# Patient Record
Sex: FEMALE | Race: ASIAN | NOT HISPANIC OR LATINO | Employment: OTHER | URBAN - METROPOLITAN AREA
[De-identification: names, ages, dates, MRNs, and addresses within clinical notes are randomized per-mention and may not be internally consistent; named-entity substitution may affect disease eponyms.]

---

## 2018-08-19 ENCOUNTER — APPOINTMENT (OUTPATIENT)
Dept: RADIOLOGY | Facility: MEDICAL CENTER | Age: 83
End: 2018-08-19
Attending: EMERGENCY MEDICINE

## 2018-08-19 ENCOUNTER — HOSPITAL ENCOUNTER (EMERGENCY)
Facility: MEDICAL CENTER | Age: 83
End: 2018-08-19
Attending: EMERGENCY MEDICINE

## 2018-08-19 VITALS
WEIGHT: 108 LBS | SYSTOLIC BLOOD PRESSURE: 131 MMHG | BODY MASS INDEX: 19.14 KG/M2 | RESPIRATION RATE: 20 BRPM | HEIGHT: 63 IN | OXYGEN SATURATION: 97 % | HEART RATE: 71 BPM | DIASTOLIC BLOOD PRESSURE: 51 MMHG | TEMPERATURE: 98.8 F

## 2018-08-19 DIAGNOSIS — R07.89 LEFT-SIDED CHEST WALL PAIN: ICD-10-CM

## 2018-08-19 DIAGNOSIS — S22.42XA CLOSED FRACTURE OF MULTIPLE RIBS OF LEFT SIDE, INITIAL ENCOUNTER: ICD-10-CM

## 2018-08-19 PROCEDURE — 71101 X-RAY EXAM UNILAT RIBS/CHEST: CPT | Mod: LT

## 2018-08-19 PROCEDURE — 99284 EMERGENCY DEPT VISIT MOD MDM: CPT

## 2018-08-19 RX ORDER — HYDROCODONE BITARTRATE AND ACETAMINOPHEN 5; 325 MG/1; MG/1
1 TABLET ORAL EVERY 8 HOURS PRN
Qty: 10 TAB | Refills: 0 | Status: SHIPPED | OUTPATIENT
Start: 2018-08-19 | End: 2018-08-22

## 2018-08-19 ASSESSMENT — ENCOUNTER SYMPTOMS
SHORTNESS OF BREATH: 0
CHILLS: 0
FEVER: 0
VOMITING: 0
HEADACHES: 0
FALLS: 1

## 2018-08-19 ASSESSMENT — PAIN SCALES - GENERAL: PAINLEVEL_OUTOF10: 10

## 2018-08-19 NOTE — ED PROVIDER NOTES
ED Provider Note    ED Provider Note    Primary care provider: No primary care provider on file.  Means of arrival: EMS  History obtained from: Patient  History limited by: [facility]    CHIEF COMPLAINT  Chief Complaint   Patient presents with   • Rib Pain     left rib pain 2nd to fall (2 or 3 days ago). pt lives in local [facility] facility. facility unable to contact DPOA.       HPI  Margot Jay is a 88 y.o. female who presents to the Emergency Department with a chief complaint of left lateral lower chest wall pain.  Patient states that she fell on Thursday, slipped, landing on the edge of the bed.  Did not strike her head.  She denies falling all the way to the ground.  She also reports a history of fibromyalgia and has baseline diffuse pain.  She did strike the top lip and she has some swelling there.  She denies striking her head.  No loss of consciousness.  No vomiting.  No difficulty breathing.  No other complaints.    REVIEW OF SYSTEMS  Review of Systems   Constitutional: Negative for chills and fever.   Respiratory: Negative for shortness of breath.    Cardiovascular: Positive for chest pain.   Gastrointestinal: Negative for vomiting.   Musculoskeletal: Positive for falls.   Neurological: Negative for headaches.   All other systems reviewed and are negative.      PAST MEDICAL HISTORY   has a past medical history of Hypertension.    SURGICAL HISTORY  patient denies any surgical history    SOCIAL HISTORY  Social History   Substance Use Topics   • Smoking status: Never Smoker   • Smokeless tobacco: Never Used   • Alcohol use No      History   Drug use: Unknown       FAMILY HISTORY  History reviewed. No pertinent family history.    CURRENT MEDICATIONS  Home Medications     Reviewed by Odin Villeda R.N. (Registered Nurse) on 08/19/18 at 0935  Med List Status: Not Addressed   Medication Last Dose Status        Patient Mikey Taking any Medications                       ALLERGIES  No Known  "Allergies    PHYSICAL EXAM  VITAL SIGNS: /51   Pulse 72   Temp 37.1 °C (98.8 °F)   Resp 20   Ht 1.6 m (5' 3\")   Wt 49 kg (108 lb)   SpO2 96%   BMI 19.13 kg/m²   Vitals reviewed.  Constitutional: Patient is oriented to person and place. Appears well-developed and well-nourished. No distress.   Head: Normocephalic and atraumatic.   Ears: Normal external ears bilaterally.   Mouth/Throat: Oropharynx is clear and moist  Eyes: Conjunctivae are normal. Pupils are equal, round, and reactive to light.   Neck: Normal range of motion. Neck supple.  Cardiovascular: Normal rate, regular rhythm and normal heart sounds.   Pulmonary/Chest: Effort normal and breath sounds normal. No respiratory distress, no wheezes, rhonchi, or rales. left lateral lower chest wall tenderness without overlying skin changes.  Abdominal: Soft. Bowel sounds are normal. There is no tenderness. No rebound or guarding, or peritoneal signs.  Musculoskeletal: No edema and no tenderness.   Neurological: No focal deficits.   Skin: Skin is warm and dry. No erythema. No pallor.   Psychiatric: Patient has a normal mood and affect.       RADIOLOGY  [id number] (WITH 1-VIEW CXR) LEFT   Final Result      1.  Limited rib series due to body habitus but with possible left ninth and 10th rib fracture      2.  No pneumothorax      3.  Aortic atherosclerotic plaque        The radiologist's interpretation of all radiological studies have been reviewed by me.    COURSE & MEDICAL DECISION MAKING  Pertinent Labs & Imaging studies reviewed. (See chart for details)    Obtained and reviewed past medical records.  No prior encounters in our EMR    9:57 AM - Patient seen and examined at bedside.  Patient fell a few days ago, now complaining of left lateral chest wall pain.  No overlying skin changes.  Awaiting x-ray results.  Vital signs normal.  No tachypnea or work of breathing increased.       The differential diagnoses include but are not limited to: " Contusion/strain versus rib fracture, pneumothorax    11:18 AM she is reevaluated the bedside.  We discussed x-ray findings showing 2 left sided rib fractures.  No pneumothorax.  No other acute findings.  Patient be discharged to home in stable condition.  She is given pain medication.  She is also advised she can take Tylenol or ibuprofen but no ibuprofen with the Celebrex that she is taking at home.    Patient was discharged home in stable condition.    FINAL IMPRESSION  1. Left-sided chest wall pain    2. Closed fracture of multiple ribs of left side, initial encounter

## 2018-08-19 NOTE — ED TRIAGE NOTES
Chief Complaint   Patient presents with   • Rib Pain     left rib pain 2nd to fall (2 or 3 days ago). pt lives in local memory care facility. facility unable to contact DPOA.     Pt BIB EMS c/o left rib pain. Local [facility] care facility was unable to tell EMS specifically if patient fell Thursday or Friday. Pt states pain is only present when attempting to move. Pt is not on any blood thinners. Pt oriented to situation person and place, disoriented to time.

## 2018-08-19 NOTE — DISCHARGE INSTRUCTIONS
Rib Fracture  A rib fracture is a break or crack in one of the bones of the ribs. The ribs are a group of long, curved bones that wrap around your chest and attach to your spine. They protect your lungs and other organs in the chest cavity. A broken or cracked rib is often painful, but most do not cause other problems. Most rib fractures heal on their own over time. However, rib fractures can be more serious if multiple ribs are broken or if broken ribs move out of place and push against other structures.  What are the causes?  · A direct blow to the chest. For example, this could happen during contact sports, a car accident, or a fall against a hard object.  · Repetitive movements with high force, such as pitching a baseball or having severe coughing spells.  What are the signs or symptoms?  · Pain when you breathe in or cough.  · Pain when someone presses on the injured area.  How is this diagnosed?  Your caregiver will perform a physical exam. Various imaging tests may be ordered to confirm the diagnosis and to look for related injuries. These tests may include a chest X-ray, computed tomography (CT), magnetic resonance imaging (MRI), or a bone scan.  How is this treated?  Rib fractures usually heal on their own in 1-3 months. The longer healing period is often associated with a continued cough or other aggravating activities. During the healing period, pain control is very important. Medication is usually given to control pain. Hospitalization or surgery may be needed for more severe injuries, such as those in which multiple ribs are broken or the ribs have moved out of place.  Follow these instructions at home:  · Avoid strenuous activity and any activities or movements that cause pain. Be careful during activities and avoid bumping the injured rib.  · Gradually increase activity as directed by your caregiver.  · Only take over-the-counter or prescription medications as directed by your caregiver. Do not take  other medications without asking your caregiver first.  · Apply ice to the injured area for the first 1-2 days after you have been treated or as directed by your caregiver. Applying ice helps to reduce inflammation and pain.  ¨ Put ice in a plastic bag.  ¨ Place a towel between your skin and the bag.  ¨ Leave the ice on for 15-20 minutes at a time, every 2 hours while you are awake.  · Perform deep breathing as directed by your caregiver. This will help prevent pneumonia, which is a common complication of a broken rib. Your caregiver may instruct you to:  ¨ Take deep breaths several times a day.  ¨ Try to cough several times a day, holding a pillow against the injured area.  ¨ Use a device called an incentive spirometer to practice deep breathing several times a day.  · Drink enough fluids to keep your urine clear or pale yellow. This will help you avoid constipation.  · Do not wear a rib belt or binder. These restrict breathing, which can lead to pneumonia.  Get help right away if:  · You have a fever.  · You have difficulty breathing or shortness of breath.  · You develop a continual cough, or you cough up thick or bloody sputum.  · You feel sick to your stomach (nausea), throw up (vomit), or have abdominal pain.  · You have worsening pain not controlled with medications.  This information is not intended to replace advice given to you by your health care provider. Make sure you discuss any questions you have with your health care provider.  Document Released: 12/18/2006 Document Revised: 05/25/2017 Document Reviewed: 02/19/2014  [employer] Interactive Patient Education © 2017 [employer]

## 2018-08-19 NOTE — ED NOTES
Pt given DC instructions, including medication education, with verbalized understanding. Assisted to lobby with staff from [facility]. dc via cab in wheelchair.

## 2021-01-15 DIAGNOSIS — Z23 NEED FOR VACCINATION: ICD-10-CM
